# Patient Record
Sex: FEMALE | Race: WHITE | NOT HISPANIC OR LATINO | URBAN - METROPOLITAN AREA
[De-identification: names, ages, dates, MRNs, and addresses within clinical notes are randomized per-mention and may not be internally consistent; named-entity substitution may affect disease eponyms.]

---

## 2022-12-01 ENCOUNTER — INPATIENT (INPATIENT)
Facility: HOSPITAL | Age: 77
LOS: 0 days | Discharge: HOME | DRG: 303 | End: 2022-12-02
Attending: INTERNAL MEDICINE | Admitting: INTERNAL MEDICINE
Payer: MEDICAID

## 2022-12-01 VITALS
HEIGHT: 56 IN | SYSTOLIC BLOOD PRESSURE: 125 MMHG | WEIGHT: 229.94 LBS | TEMPERATURE: 98 F | RESPIRATION RATE: 22 BRPM | HEART RATE: 80 BPM | OXYGEN SATURATION: 96 % | DIASTOLIC BLOOD PRESSURE: 68 MMHG

## 2022-12-01 DIAGNOSIS — R60.0 LOCALIZED EDEMA: ICD-10-CM

## 2022-12-01 DIAGNOSIS — I20.0 UNSTABLE ANGINA: ICD-10-CM

## 2022-12-01 DIAGNOSIS — I50.9 HEART FAILURE, UNSPECIFIED: ICD-10-CM

## 2022-12-01 LAB
ALBUMIN SERPL ELPH-MCNC: 3.9 G/DL — SIGNIFICANT CHANGE UP (ref 3.3–5)
ALP SERPL-CCNC: 80 U/L — SIGNIFICANT CHANGE UP (ref 40–120)
ALT FLD-CCNC: 16 U/L — SIGNIFICANT CHANGE UP (ref 10–45)
ANION GAP SERPL CALC-SCNC: 9 MMOL/L — SIGNIFICANT CHANGE UP (ref 5–17)
APTT BLD: 36 SEC — HIGH (ref 27.5–35.5)
AST SERPL-CCNC: 16 U/L — SIGNIFICANT CHANGE UP (ref 10–40)
BASOPHILS # BLD AUTO: 0.02 K/UL — SIGNIFICANT CHANGE UP (ref 0–0.2)
BASOPHILS NFR BLD AUTO: 0.5 % — SIGNIFICANT CHANGE UP (ref 0–2)
BILIRUB SERPL-MCNC: 0.3 MG/DL — SIGNIFICANT CHANGE UP (ref 0.2–1.2)
BUN SERPL-MCNC: 10 MG/DL — SIGNIFICANT CHANGE UP (ref 7–23)
CALCIUM SERPL-MCNC: 9.4 MG/DL — SIGNIFICANT CHANGE UP (ref 8.4–10.5)
CHLORIDE SERPL-SCNC: 102 MMOL/L — SIGNIFICANT CHANGE UP (ref 96–108)
CK MB CFR SERPL CALC: 2 NG/ML — SIGNIFICANT CHANGE UP (ref 0–6.7)
CK SERPL-CCNC: 44 U/L — SIGNIFICANT CHANGE UP (ref 25–170)
CO2 SERPL-SCNC: 29 MMOL/L — SIGNIFICANT CHANGE UP (ref 22–31)
CREAT SERPL-MCNC: 0.64 MG/DL — SIGNIFICANT CHANGE UP (ref 0.5–1.3)
EGFR: 91 ML/MIN/1.73M2 — SIGNIFICANT CHANGE UP
EOSINOPHIL # BLD AUTO: 0.04 K/UL — SIGNIFICANT CHANGE UP (ref 0–0.5)
EOSINOPHIL NFR BLD AUTO: 1 % — SIGNIFICANT CHANGE UP (ref 0–6)
GLUCOSE SERPL-MCNC: 91 MG/DL — SIGNIFICANT CHANGE UP (ref 70–99)
HCT VFR BLD CALC: 39.5 % — SIGNIFICANT CHANGE UP (ref 34.5–45)
HGB BLD-MCNC: 12.7 G/DL — SIGNIFICANT CHANGE UP (ref 11.5–15.5)
IMM GRANULOCYTES NFR BLD AUTO: 0.2 % — SIGNIFICANT CHANGE UP (ref 0–0.9)
INR BLD: 1.04 — SIGNIFICANT CHANGE UP (ref 0.88–1.16)
LYMPHOCYTES # BLD AUTO: 1.71 K/UL — SIGNIFICANT CHANGE UP (ref 1–3.3)
LYMPHOCYTES # BLD AUTO: 41.9 % — SIGNIFICANT CHANGE UP (ref 13–44)
MAGNESIUM SERPL-MCNC: 2 MG/DL — SIGNIFICANT CHANGE UP (ref 1.6–2.6)
MCHC RBC-ENTMCNC: 30 PG — SIGNIFICANT CHANGE UP (ref 27–34)
MCHC RBC-ENTMCNC: 32.2 GM/DL — SIGNIFICANT CHANGE UP (ref 32–36)
MCV RBC AUTO: 93.4 FL — SIGNIFICANT CHANGE UP (ref 80–100)
MONOCYTES # BLD AUTO: 0.41 K/UL — SIGNIFICANT CHANGE UP (ref 0–0.9)
MONOCYTES NFR BLD AUTO: 10 % — SIGNIFICANT CHANGE UP (ref 2–14)
NEUTROPHILS # BLD AUTO: 1.89 K/UL — SIGNIFICANT CHANGE UP (ref 1.8–7.4)
NEUTROPHILS NFR BLD AUTO: 46.4 % — SIGNIFICANT CHANGE UP (ref 43–77)
NRBC # BLD: 0 /100 WBCS — SIGNIFICANT CHANGE UP (ref 0–0)
NT-PROBNP SERPL-SCNC: 133 PG/ML — SIGNIFICANT CHANGE UP (ref 0–300)
PHOSPHATE SERPL-MCNC: 2.4 MG/DL — LOW (ref 2.5–4.5)
PLATELET # BLD AUTO: 164 K/UL — SIGNIFICANT CHANGE UP (ref 150–400)
POTASSIUM SERPL-MCNC: 4.4 MMOL/L — SIGNIFICANT CHANGE UP (ref 3.5–5.3)
POTASSIUM SERPL-SCNC: 4.4 MMOL/L — SIGNIFICANT CHANGE UP (ref 3.5–5.3)
PROT SERPL-MCNC: 7.3 G/DL — SIGNIFICANT CHANGE UP (ref 6–8.3)
PROTHROM AB SERPL-ACNC: 12.4 SEC — SIGNIFICANT CHANGE UP (ref 10.5–13.4)
RAPID RVP RESULT: SIGNIFICANT CHANGE UP
RBC # BLD: 4.23 M/UL — SIGNIFICANT CHANGE UP (ref 3.8–5.2)
RBC # FLD: 13.7 % — SIGNIFICANT CHANGE UP (ref 10.3–14.5)
SARS-COV-2 RNA SPEC QL NAA+PROBE: SIGNIFICANT CHANGE UP
SODIUM SERPL-SCNC: 140 MMOL/L — SIGNIFICANT CHANGE UP (ref 135–145)
TROPONIN T SERPL-MCNC: 0.01 NG/ML — SIGNIFICANT CHANGE UP (ref 0–0.01)
TROPONIN T SERPL-MCNC: <0.01 NG/ML — SIGNIFICANT CHANGE UP (ref 0–0.01)
WBC # BLD: 4.08 K/UL — SIGNIFICANT CHANGE UP (ref 3.8–10.5)
WBC # FLD AUTO: 4.08 K/UL — SIGNIFICANT CHANGE UP (ref 3.8–10.5)

## 2022-12-01 PROCEDURE — 71045 X-RAY EXAM CHEST 1 VIEW: CPT | Mod: 26

## 2022-12-01 PROCEDURE — 99284 EMERGENCY DEPT VISIT MOD MDM: CPT

## 2022-12-01 PROCEDURE — 99222 1ST HOSP IP/OBS MODERATE 55: CPT

## 2022-12-01 PROCEDURE — 93306 TTE W/DOPPLER COMPLETE: CPT | Mod: 26

## 2022-12-01 PROCEDURE — 71045 X-RAY EXAM CHEST 1 VIEW: CPT | Mod: 26,77

## 2022-12-01 RX ORDER — FUROSEMIDE 40 MG
20 TABLET ORAL DAILY
Refills: 0 | Status: DISCONTINUED | OUTPATIENT
Start: 2022-12-02 | End: 2022-12-02

## 2022-12-01 RX ORDER — PANTOPRAZOLE SODIUM 20 MG/1
40 TABLET, DELAYED RELEASE ORAL
Refills: 0 | Status: DISCONTINUED | OUTPATIENT
Start: 2022-12-01 | End: 2022-12-02

## 2022-12-01 RX ORDER — ASPIRIN/CALCIUM CARB/MAGNESIUM 324 MG
1 TABLET ORAL
Qty: 0 | Refills: 0 | DISCHARGE

## 2022-12-01 RX ORDER — ASPIRIN/CALCIUM CARB/MAGNESIUM 324 MG
324 TABLET ORAL ONCE
Refills: 0 | Status: COMPLETED | OUTPATIENT
Start: 2022-12-01 | End: 2022-12-01

## 2022-12-01 RX ORDER — METOPROLOL TARTRATE 50 MG
25 TABLET ORAL ONCE
Refills: 0 | Status: DISCONTINUED | OUTPATIENT
Start: 2022-12-01 | End: 2022-12-01

## 2022-12-01 RX ORDER — METOPROLOL TARTRATE 50 MG
25 TABLET ORAL
Refills: 0 | Status: DISCONTINUED | OUTPATIENT
Start: 2022-12-01 | End: 2022-12-02

## 2022-12-01 RX ORDER — FUROSEMIDE 40 MG
1 TABLET ORAL
Qty: 0 | Refills: 0 | DISCHARGE

## 2022-12-01 RX ORDER — ASPIRIN/CALCIUM CARB/MAGNESIUM 324 MG
81 TABLET ORAL DAILY
Refills: 0 | Status: DISCONTINUED | OUTPATIENT
Start: 2022-12-02 | End: 2022-12-02

## 2022-12-01 RX ORDER — NITROGLYCERIN 6.5 MG
0.4 CAPSULE, EXTENDED RELEASE ORAL ONCE
Refills: 0 | Status: COMPLETED | OUTPATIENT
Start: 2022-12-01 | End: 2022-12-01

## 2022-12-01 RX ORDER — HEPARIN SODIUM 5000 [USP'U]/ML
5000 INJECTION INTRAVENOUS; SUBCUTANEOUS EVERY 8 HOURS
Refills: 0 | Status: DISCONTINUED | OUTPATIENT
Start: 2022-12-01 | End: 2022-12-02

## 2022-12-01 RX ADMIN — HEPARIN SODIUM 5000 UNIT(S): 5000 INJECTION INTRAVENOUS; SUBCUTANEOUS at 22:32

## 2022-12-01 RX ADMIN — Medication 324 MILLIGRAM(S): at 13:50

## 2022-12-01 RX ADMIN — Medication 25 MILLIGRAM(S): at 17:52

## 2022-12-01 RX ADMIN — Medication 0.4 MILLIGRAM(S): at 17:30

## 2022-12-01 NOTE — H&P ADULT - NSHPROSALLOTHERNEGRD_GEN_ALL_CORE
Emergency Room Nursing Note        Patient Name: Gabriel Weber      : 1978             MRN: [id number]      Chief Complaint:  Abnormal Lab Results      Admit Diagnosis: No admission diagnoses are documented for this encounter. Admitting Provider: No admitting provider for patient encounter. Surgery: * No surgery found *           Obtained verbal consent from patient consenting to a Legal Blood Draw witness by Lynette Cruz RN, Officer Cortney Ramirez also present. Patient alert & oriented at time of consent. Patient verbalizes understanding of the purpose of the Legal Blood Draw.          Lines:        Signed by: Jaylan Lujan RN, ALBER, BSN, [facility]IA [facility]                                              2022 at 10:13 PM All other review of systems negative, except as noted in HPI

## 2022-12-01 NOTE — ED ADULT NURSE REASSESSMENT NOTE - NS ED NURSE REASSESS COMMENT FT1
ECHO being performed @ bedside to keep pt. in ED on monitor. Cards PA Brandon called for update on plan for admission. Pt. must remain on monitor to all off unit tests. all aware of plan.

## 2022-12-01 NOTE — H&P ADULT - PROBLEM SELECTOR PLAN 1
P/w CP "burning" w/ radiation to L arm and associated SOB/BECK/nausea. No established Cardiologist or prior w/u.  -Currently w/ 8/10 CP, VSS WNL, s/p NTG 0.4mg SL x1 and Maalox  -EKG: NSR @78bpm w/o ischemic changes. CE's neg x1, f/u serial CE's at 6pm and 10pm  -Echo ordered, f/u  -s/p ASA 324mg x1 in the ED, c/w ASA 81mg QD. Started Lopressor 25mg PO BID and Pantoprazole PO 40mg QD  -Pt initially recommended for cath but refused  -NPO after MN for CCTA 12/2AM, f/u  -Lipid panel, A1c, TSH ordered for AM, f/u  -VS per routine, cont tele/pulse ox P/w CP "burning" w/ radiation to L arm and associated SOB/BECK/nausea. No established Cardiologist or prior w/u.  -Currently w/ 8/10 CP, VSS WNL, s/p NTG 0.4mg SL x1 and Maalox  -EKG: NSR @78bpm w/o ischemic changes. CE's neg x1, f/u serial CE's at 6pm and 10pm  -Echo ordered, f/u  -Initial CXR poor quality, repeat ordered, f/u  -s/p ASA 324mg x1 in the ED, c/w ASA 81mg QD. Started Lopressor 25mg PO BID and Pantoprazole PO 40mg QD  -Pt initially recommended for cath but refused  -NPO after MN for CCTA 12/2AM, f/u  -Lipid panel, A1c, TSH ordered for AM, f/u  -VS per routine, cont tele/pulse ox

## 2022-12-01 NOTE — ED ADULT NURSE NOTE - BEFORE THE ILLNESS OR INJURY
Negative for shortness of breath and wheezing. Recent hospitalization for exacerbation of COPD. Currently feels better in terms of her breathing. Cardiovascular:        Recent myocardial infarction with recent heart catheterization   Gastrointestinal: Negative. Endocrine: Positive for polydipsia, polyphagia and polyuria. Morbid obesity. Significantly elevated hemoglobin A1c at 9.5. Genitourinary: Positive for enuresis. Musculoskeletal:        Improved greater trochanteric pain. Occasional lumbar discomfort. Skin:        Venous stasis changes of the lower extremity. Allergic/Immunologic: Negative. Neurological: Negative. Hematological: Negative. Psychiatric/Behavioral:        Increased anxiety with breathing issues. Prior to Visit Medications    Medication Sig Taking? Authorizing Provider   COMBIVENT RESPIMAT  MCG/ACT AERS inhaler 1 PUFF(S) INHALED 4 TIMES A DAY FOR 5 DAYS THEN USE AS NEEDED FOR SHORTNESS OF BREATH OR WHEEZING Yes Historical Provider, MD   predniSONE (DELTASONE) 20 MG tablet TAKE TWO TABLETS TWO TIMES A DAY X3 DAYS THEN 1 TAB TWO TIMES A DAY X3 DAYS THEN 1 TABLET DAILY X3 DAYS 1/2 TAB DAILY X2 DAYS Yes Historical Provider, MD   sertraline (ZOLOFT) 100 MG tablet !/2 for one week then one po daily Yes Portillo Decker MD   ALPRAZolam (XANAX) 0.25 MG tablet Take 1 tablet by mouth 3 times daily as needed for Anxiety for up to 30 days.  Yes Portillo Decker MD   vitamin D (ERGOCALCIFEROL) 1.25 MG (07986 UT) CAPS capsule Take 1 capsule by mouth Twice a Week Yes Portillo Decker MD   albuterol sulfate  (90 Base) MCG/ACT inhaler Inhale 2 puffs into the lungs every 6 hours as needed for Wheezing Yes Portillo Decker MD   furosemide (LASIX) 80 MG tablet Take 1 tablet by mouth daily Yes Portillo Decker MD   clopidogrel (PLAVIX) 75 MG tablet Take 1 tablet by mouth daily Yes Portillo Decker MD   Fluticasone furoate-vilanterol (BREO ELLIPTA) 200-25 MCG/INH AEPB inhaler Inhale 1 puff into the lungs daily Yes Laney Escobar MD   glimepiride (AMARYL) 4 MG tablet Take 1 tablet by mouth 2 times daily (before meals) Yes Laney Escobar MD   lisinopril (PRINIVIL;ZESTRIL) 20 MG tablet Take 1 tablet by mouth daily Yes Laney Escobar MD   metFORMIN (GLUCOPHAGE) 1000 MG tablet Take 1 tablet by mouth 2 times daily (with meals) Yes Laney Escobar MD   rosuvastatin (CRESTOR) 40 MG tablet Take 1 tablet by mouth daily Yes Laney Escobar MD   ranolazine (RANEXA) 500 MG extended release tablet TAKE 1 TABLET BY MOUTH TWICE DAILY Yes Historical Provider, MD   montelukast (SINGULAIR) 10 MG tablet  Yes Historical Provider, MD   carvedilol (COREG) 6.25 MG tablet TAKE 1 TABLET BY MOUTH TWICE DAILY Yes Historical Provider, MD   insulin glargine (LANTUS SOLOSTAR) 100 UNIT/ML injection pen !0 units nightly and increase by 5 units every 3 days until -125  Patient taking differently: Taking 45 units daily Yes Laney Escobar MD   aspirin 81 MG EC tablet Take 81 mg by mouth daily Yes Historical Provider, MD   ezetimibe (ZETIA) 10 MG tablet Take 1 tablet by mouth daily Yes Laney Escobar MD   ipratropium-albuterol (DUONEB) 0.5-2.5 (3) MG/3ML SOLN nebulizer solution Inhale 3 mLs into the lungs every 4 hours Yes Laney Escobar MD   zinc gluconate 50 MG tablet Take 50 mg by mouth daily  Historical Provider, MD   isosorbide dinitrate (ISORDIL) 30 MG tablet Take 1 tablet by mouth daily  Laney Escobar MD   potassium chloride (KLOR-CON) 10 MEQ extended release tablet Take 1 tablet by mouth daily  Laney Escobar MD   Insulin Pen Needle 32G X 5 MM MISC 1 each by Does not apply route daily  Laney Escobar MD        Allergies   Allergen Reactions    Iodides      Could not breathe, gasping, coughing       Past Medical History:   Diagnosis Date    CAD (coronary artery disease) 2005    First MI at age 36.,  CABG in 2005 triple vessel, total of 4 stents placed-last in October 2018.     COPD (chronic obstructive pulmonary disease) ([facility] 1    Enuresis age 13    HTN (hypertension) 2005    Hyperlipidemia 2005    Lumbar disc disease     Lymphedema 10/2018    Tobacco abuse 1985    Tobacco abuse counseling July second 2019    Type 2 diabetes mellitus ([facility]     Warthin's tumor 10/2018       Past Surgical History:   Procedure Laterality Date    CARDIAC CATHETERIZATION  12/2020    CORONARY ARTERY BYPASS GRAFT  2005    three vessel    CYSTOSCOPY      For enuresis    HYSTERECTOMY      Menorrhagia    TONSILLECTOMY AND ADENOIDECTOMY      as a child    TUBAL LIGATION         Social History     Socioeconomic History    Marital status:      Spouse name: Not on file    Number of children: Not on file    Years of education: Not on file    Highest education level: Not on file   Occupational History    Not on file   Social Needs    Financial resource strain: Not on file    Food insecurity     Worry: Not on file     Inability: Not on file    Transportation needs     Medical: Not on file     Non-medical: Not on file   Tobacco Use    Smoking status: Former Smoker     Packs/day: 0.50     Years: 40.00     Pack years: 20.00     Types: Cigarettes    Smokeless tobacco: Never Used   Substance and Sexual Activity    Alcohol use: Not Currently    Drug use: Never    Sexual activity: Not on file   Lifestyle    Physical activity     Days per week: 0 days     Minutes per session: 0 min    Stress: Very much   Relationships    Social connections     Talks on phone: Not on file     Gets together: Not on file     Attends Sikhism service: Not on file     Active member of club or organization: Not on file     Attends meetings of clubs or organizations: Not on file     Relationship status: Not on file    Intimate partner violence     Fear of current or ex partner: Not on file     Emotionally abused: Not on file     Physically abused: Not on file     Forced sexual activity: Not on file   Other Topics Concern  Not on file   Social History Narrative    Not on file        Family History   Problem Relation Age of Onset    Coronary Art Dis Mother     Diabetes Mother     Cancer Mother     COPD Father        Vitals:    05/12/21 0720   BP: 120/80   Pulse: 87   Resp: 20   Temp: 97.6 °F (36.4 °C)   SpO2: 96%   Weight: 278 lb (126.1 kg)     Estimated body mass index is 42.27 kg/m² as calculated from the following:    Height as of 12/10/20: 5' 8\" (1.727 m). Weight as of this encounter: 278 lb (126.1 kg). Physical Exam  Constitutional:       Comments: Very obese individual in no apparent distress   HENT:      Head: Normocephalic and atraumatic. Right Ear: External ear normal.      Left Ear: External ear normal.      Nose: Nose normal.   Eyes:      Conjunctiva/sclera: Conjunctivae normal.      Pupils: Pupils are equal, round, and reactive to light. Neck:      Musculoskeletal: Normal range of motion and neck supple. Cardiovascular:      Rate and Rhythm: Normal rate and regular rhythm. Heart sounds: Normal heart sounds. Comments: 2-3+ pitting edema to the knee bilaterally  Pulmonary:      Comments: Decreased breath sounds throughout all lung fields but no evidence of wheeze, rhonchi or rales. Musculoskeletal: Normal range of motion. Comments: Imitation of range of motion to both inversion and eversion of the hips   Skin:     Comments: Venous stasis changes below the mid tibia bilaterally. No evidence of cellulitis today. Neurological:      Mental Status: She is alert and oriented to person, place, and time. Psychiatric:         Behavior: Behavior normal.         Thought Content: Thought content normal.         Judgment: Judgment normal.       Deborah Richards was seen today for follow-up from hospital.    Diagnoses and all orders for this visit:    Centrilobular emphysema (HCC)  -     ALPRAZolam (XANAX) 0.25 MG tablet; Take 1 tablet by mouth 3 times daily as needed for Anxiety for up to 30 days.   - Jess Reilly DO, Cardiology, [facility]    Coronary artery disease involving native coronary artery of native heart without angina pectoris  -     [facility], Priscila DO Luis A, Cardiology, [facility]    Type 2 diabetes mellitus with other circulatory complication, without long-term current use of insulin ([facility]    Acute exacerbation of chronic obstructive pulmonary disease (COPD) ([facility]    Pain of both hip joints    Mixed hyperlipidemia    Vitamin D deficiency    Tobacco abuse    Other orders  -     sertraline (ZOLOFT) 100 MG tablet; !/2 for one week then one po daily    6-minute walk will be performed. Smoking cessation is urged. Referral to local pulmonary and local cardiology. Hopefully trying to tie up all of her care and easily accessed location. Patient will be given sertraline 100 mg initially starting at 50 for a week and then subsequently 100. I have warned her of potential side effects of this medication. Patient is to be seen back in 6 weeks to assess response. In the meantime pulmonary and cardiology evaluations. No

## 2022-12-01 NOTE — H&P ADULT - NSHPSOURCEINFORD_GEN_ALL_CORE
Chart(s)/Patient Medical Necessity Information: It is in your best interest to select a reason for this procedure from the list below. All of these items fulfill various CMS LCD requirements except the new and changing color options. Consent: The patient's consent was obtained including but not limited to risks of crusting, scabbing, blistering, scarring, darker or lighter pigmentary change, recurrence, incomplete removal and infection. Render Note In Bullet Format When Appropriate: No Anesthesia Volume In Cc: 4 Treatment Number (Will Not Render If 0): 0 Post-Care Instructions: I reviewed with the patient in detail post-care instructions. Patient is to wear sunprotection, and avoid picking at any of the treated lesions. Pt may apply Vaseline to crusted or scabbing areas. Detail Level: Simple Medical Necessity Clause: This procedure was medically necessary because the lesions that were treated were: Chart(s)/Patient/Child

## 2022-12-01 NOTE — ED ADULT TRIAGE NOTE - CHIEF COMPLAINT QUOTE
Patient presents to ED accompanied by daughter for eval of shortness of breath and "heart burning" since waking up this morning.  Takes Furosemide daily.  STAT EKG in progress.

## 2022-12-01 NOTE — H&P ADULT - NSHPLABSRESULTS_GEN_ALL_CORE
CARDIAC MARKERS ( 01 Dec 2022 12:26 )  x     / <0.01 ng/mL / 44 U/L / x     / 2.0 ng/mL                          12.7   4.08  )-----------( 164      ( 01 Dec 2022 12:26 )             39.5   12-01    140  |  102  |  10  ----------------------------<  91  4.4   |  29  |  0.64    Ca    9.4      01 Dec 2022 12:26  Phos  2.4     12-01  Mg     2.0     12-01    TPro  7.3  /  Alb  3.9  /  TBili  0.3  /  DBili  x   /  AST  16  /  ALT  16  /  AlkPhos  80  12-01    PT/INR - ( 01 Dec 2022 12:26 )   PT: 12.4 sec;   INR: 1.04          PTT - ( 01 Dec 2022 12:26 )  PTT:36.0 sec

## 2022-12-01 NOTE — H&P ADULT - HISTORY OF PRESENT ILLNESS
*Meds confirmed w/ pt and daughter  76 y/o Occitan speaking Female w/ PMHx of chronic LE edema on lasix and gallstones, no other reported PMHx/PSHx, who now presents to [facility] ED c/o 8/10 L-sided CP described as, "burning" w/ radiation to the L shoulder, present at rest and independent of exertion, and w/ associated SOB, BECK after minimal exertion (walking around her apartment), and non-productive cough x3 days. Pt denies palpitations, dizziness, LOC, V/D, fever/chills/sick contact, diaphoresis, and orthopnea/PND. Pt denies ever having undergone a cardiac w/u and does not have an established Cardiologist.  In the ED, VS: T 97.9F, HR 80, /68, RR 18 non-labored, SpO2 96% on RA. EKG: NSR @78bpm w/o ischemic changes. Labs significant for COVID PCR neg, RVP neg, CE's neg x1, BNP WNL. S/p ASA 324mg x1 in the ED, pt is now admitted to cardiac tele for further management of unstable angina.

## 2022-12-01 NOTE — H&P ADULT - NSHPOUTPATIENTPROVIDERS_GEN_ALL_CORE
Dr. Jose Cheney (PCP) [phone]  Pamela (Daughter/HHA)[phone]           Gala (granddaugher/HCP) [phone]

## 2022-12-01 NOTE — ED PROVIDER NOTE - CLINICAL SUMMARY MEDICAL DECISION MAKING FREE TEXT BOX
77F denies PMH/PSH, takes lasix 20 mg q day (compliant) for "water in legs" p/w 3 day hx of L sided chest "burning" radiating to L shoulder 8/10  in severity worse on exertion with associated SOB, BECK, and cough. On exam, VS wnl, +RLL crackles and 2+ pitting edema.    ddx: CHF vs ACS vs pna vs viral illness vs bronchitis    Plan:  - ekg  - labs  - cxr  - rvp  - reassess

## 2022-12-01 NOTE — H&P ADULT - ASSESSMENT
76 y/o Ukrainian speaking Female w/ PMHx of chronic LE edema on lasix and gallstones, no other reported PMHx/PSHx, who now presents to [facility] ED c/o 8/10 L-sided CP described as, "burning" w/ radiation to the L shoulder, present at rest and independent of exertion, and w/ associated SOB, BECK after mild exertion, and non-productive cough x3 days. Pt is now admitted to cardiac tele for further management of unstable angina.

## 2022-12-01 NOTE — ED ADULT NURSE NOTE - OBJECTIVE STATEMENT
76 y/o female w/ hx of lower extremity swelling, on Lasix 20 mg QD, presents to the ED c/o left sided chest burning radiating to left shoulder associated w/ SOB,  and dry cough. Denies fever, chills, NVD, abdominal pain, back pain.

## 2022-12-01 NOTE — ED PROVIDER NOTE - OBJECTIVE STATEMENT
pt declines  use requests daughter at bedside to interpret    77F denies PMH/PSH, takes lasix 20 mg q day (compliant) for "water in legs" p/w 3 day hx of L sided chest "burning" radiating to L shoulder 8/10  in severity worse on exertion with associated SOB, BECK, and cough. legs are not more swollen than normal today. +hx of similar sx, does not have cardiologist. denies f/c/congestion/n/v/d/rash.

## 2022-12-01 NOTE — PATIENT PROFILE ADULT - FALL HARM RISK - HARM RISK INTERVENTIONS

## 2022-12-01 NOTE — ED PROVIDER NOTE - PROGRESS NOTE DETAILS
labs trop ekg wnl however discussed with cardiology attending and PA agree to admit pt due to concerning cp will admit

## 2022-12-01 NOTE — H&P ADULT - PROBLEM SELECTOR PLAN 2
Hx of chronic LE edema, currently at baseline 1+ non-pitting  -Recently started on Lasix 20mg PO QD by PCP 1 month ago which improved  -Echo as above  -LE duplex ordered, f/u      VTE ppx: Heparin 5000u q8hrs  GI ppx: Pantoprazole 40mg QD  Diet: DASH  PT consulted, f/u recs  Dispo: Pending clinical progression    Case d/w Dr. Jean-Baptiste    Case d/w _______ Hx of chronic LE edema, currently at baseline 1+ non-pitting  -Recently started on Lasix 20mg PO QD by PCP 1 month ago which improved, c/w Lasix 20mg PO QD  -Echo as above  -LE duplex ordered, f/u      VTE ppx: Heparin 5000u q8hrs  GI ppx: Pantoprazole 40mg QD  Diet: DASH  PT consulted, f/u recs  Dispo: Pending clinical progression    Case d/w Dr. Jean-Baptiste    Case d/w _______ Hx of chronic LE edema, currently at baseline 1+ non-pitting  -Recently started on Lasix 20mg PO QD by PCP 1 month ago which improved, c/w Lasix 20mg PO QD  -Echo as above  -LE duplex ordered, f/u      VTE ppx: Heparin 5000u q8hrs  GI ppx: Pantoprazole 40mg QD  Diet: DASH  PT consulted, f/u recs  Dispo: Pending clinical progression    Case d/w Dr. Jean-Baptiste Hx of chronic LE edema, currently at baseline 1+ non-pitting  -BNP WNL. CXR as above  -Recently started on Lasix 20mg PO QD by PCP 1 month ago which improved, c/w Lasix 20mg PO QD  -Echo as above  -LE duplex ordered, f/u      VTE ppx: Heparin 5000u q8hrs  GI ppx: Pantoprazole 40mg QD  Diet: DASH  PT consulted, f/u recs  Dispo: Pending clinical progression    Case d/w Dr. Jean-Baptiste

## 2022-12-02 VITALS — SYSTOLIC BLOOD PRESSURE: 120 MMHG | HEART RATE: 70 BPM | DIASTOLIC BLOOD PRESSURE: 60 MMHG

## 2022-12-02 LAB
A1C WITH ESTIMATED AVERAGE GLUCOSE RESULT: 5.8 % — HIGH (ref 4–5.6)
ANION GAP SERPL CALC-SCNC: 10 MMOL/L — SIGNIFICANT CHANGE UP (ref 5–17)
BASOPHILS # BLD AUTO: 0.01 K/UL — SIGNIFICANT CHANGE UP (ref 0–0.2)
BASOPHILS NFR BLD AUTO: 0.3 % — SIGNIFICANT CHANGE UP (ref 0–2)
BUN SERPL-MCNC: 13 MG/DL — SIGNIFICANT CHANGE UP (ref 7–23)
CALCIUM SERPL-MCNC: 9.3 MG/DL — SIGNIFICANT CHANGE UP (ref 8.4–10.5)
CHLORIDE SERPL-SCNC: 102 MMOL/L — SIGNIFICANT CHANGE UP (ref 96–108)
CHOLEST SERPL-MCNC: 175 MG/DL — SIGNIFICANT CHANGE UP
CK MB CFR SERPL CALC: 1.7 NG/ML — SIGNIFICANT CHANGE UP (ref 0–6.7)
CK SERPL-CCNC: 46 U/L — SIGNIFICANT CHANGE UP (ref 25–170)
CO2 SERPL-SCNC: 25 MMOL/L — SIGNIFICANT CHANGE UP (ref 22–31)
CREAT SERPL-MCNC: 0.63 MG/DL — SIGNIFICANT CHANGE UP (ref 0.5–1.3)
EGFR: 91 ML/MIN/1.73M2 — SIGNIFICANT CHANGE UP
EOSINOPHIL # BLD AUTO: 0.07 K/UL — SIGNIFICANT CHANGE UP (ref 0–0.5)
EOSINOPHIL NFR BLD AUTO: 1.8 % — SIGNIFICANT CHANGE UP (ref 0–6)
ESTIMATED AVERAGE GLUCOSE: 120 MG/DL — HIGH (ref 68–114)
GLUCOSE SERPL-MCNC: 115 MG/DL — HIGH (ref 70–99)
HCT VFR BLD CALC: 38 % — SIGNIFICANT CHANGE UP (ref 34.5–45)
HCV AB S/CO SERPL IA: 0.04 S/CO — SIGNIFICANT CHANGE UP
HCV AB SERPL-IMP: SIGNIFICANT CHANGE UP
HDLC SERPL-MCNC: 59 MG/DL — SIGNIFICANT CHANGE UP
HGB BLD-MCNC: 12.5 G/DL — SIGNIFICANT CHANGE UP (ref 11.5–15.5)
IMM GRANULOCYTES NFR BLD AUTO: 0.3 % — SIGNIFICANT CHANGE UP (ref 0–0.9)
LIPID PNL WITH DIRECT LDL SERPL: 98 MG/DL — SIGNIFICANT CHANGE UP
LYMPHOCYTES # BLD AUTO: 1.41 K/UL — SIGNIFICANT CHANGE UP (ref 1–3.3)
LYMPHOCYTES # BLD AUTO: 36.3 % — SIGNIFICANT CHANGE UP (ref 13–44)
MAGNESIUM SERPL-MCNC: 2 MG/DL — SIGNIFICANT CHANGE UP (ref 1.6–2.6)
MCHC RBC-ENTMCNC: 30 PG — SIGNIFICANT CHANGE UP (ref 27–34)
MCHC RBC-ENTMCNC: 32.9 GM/DL — SIGNIFICANT CHANGE UP (ref 32–36)
MCV RBC AUTO: 91.1 FL — SIGNIFICANT CHANGE UP (ref 80–100)
MONOCYTES # BLD AUTO: 0.4 K/UL — SIGNIFICANT CHANGE UP (ref 0–0.9)
MONOCYTES NFR BLD AUTO: 10.3 % — SIGNIFICANT CHANGE UP (ref 2–14)
NEUTROPHILS # BLD AUTO: 1.98 K/UL — SIGNIFICANT CHANGE UP (ref 1.8–7.4)
NEUTROPHILS NFR BLD AUTO: 51 % — SIGNIFICANT CHANGE UP (ref 43–77)
NON HDL CHOLESTEROL: 116 MG/DL — SIGNIFICANT CHANGE UP
NRBC # BLD: 0 /100 WBCS — SIGNIFICANT CHANGE UP (ref 0–0)
PLATELET # BLD AUTO: 147 K/UL — LOW (ref 150–400)
POTASSIUM SERPL-MCNC: 4.3 MMOL/L — SIGNIFICANT CHANGE UP (ref 3.5–5.3)
POTASSIUM SERPL-SCNC: 4.3 MMOL/L — SIGNIFICANT CHANGE UP (ref 3.5–5.3)
RBC # BLD: 4.17 M/UL — SIGNIFICANT CHANGE UP (ref 3.8–5.2)
RBC # FLD: 13.5 % — SIGNIFICANT CHANGE UP (ref 10.3–14.5)
SODIUM SERPL-SCNC: 137 MMOL/L — SIGNIFICANT CHANGE UP (ref 135–145)
TRIGL SERPL-MCNC: 92 MG/DL — SIGNIFICANT CHANGE UP
TSH SERPL-MCNC: 0.89 UIU/ML — SIGNIFICANT CHANGE UP (ref 0.27–4.2)
WBC # BLD: 3.88 K/UL — SIGNIFICANT CHANGE UP (ref 3.8–10.5)
WBC # FLD AUTO: 3.88 K/UL — SIGNIFICANT CHANGE UP (ref 3.8–10.5)

## 2022-12-02 PROCEDURE — 84443 ASSAY THYROID STIM HORMONE: CPT

## 2022-12-02 PROCEDURE — 86803 HEPATITIS C AB TEST: CPT

## 2022-12-02 PROCEDURE — 80053 COMPREHEN METABOLIC PANEL: CPT

## 2022-12-02 PROCEDURE — 83036 HEMOGLOBIN GLYCOSYLATED A1C: CPT

## 2022-12-02 PROCEDURE — 0225U NFCT DS DNA&RNA 21 SARSCOV2: CPT

## 2022-12-02 PROCEDURE — 82550 ASSAY OF CK (CPK): CPT

## 2022-12-02 PROCEDURE — 75574 CT ANGIO HRT W/3D IMAGE: CPT

## 2022-12-02 PROCEDURE — 75574 CT ANGIO HRT W/3D IMAGE: CPT | Mod: 26

## 2022-12-02 PROCEDURE — 80048 BASIC METABOLIC PNL TOTAL CA: CPT

## 2022-12-02 PROCEDURE — 97162 PT EVAL MOD COMPLEX 30 MIN: CPT

## 2022-12-02 PROCEDURE — 71045 X-RAY EXAM CHEST 1 VIEW: CPT

## 2022-12-02 PROCEDURE — 99239 HOSP IP/OBS DSCHRG MGMT >30: CPT

## 2022-12-02 PROCEDURE — 84100 ASSAY OF PHOSPHORUS: CPT

## 2022-12-02 PROCEDURE — 97116 GAIT TRAINING THERAPY: CPT

## 2022-12-02 PROCEDURE — C8929: CPT

## 2022-12-02 PROCEDURE — 36415 COLL VENOUS BLD VENIPUNCTURE: CPT

## 2022-12-02 PROCEDURE — 82553 CREATINE MB FRACTION: CPT

## 2022-12-02 PROCEDURE — 83735 ASSAY OF MAGNESIUM: CPT

## 2022-12-02 PROCEDURE — 80061 LIPID PANEL: CPT

## 2022-12-02 PROCEDURE — 85025 COMPLETE CBC W/AUTO DIFF WBC: CPT

## 2022-12-02 PROCEDURE — 99285 EMERGENCY DEPT VISIT HI MDM: CPT | Mod: 25

## 2022-12-02 PROCEDURE — 85730 THROMBOPLASTIN TIME PARTIAL: CPT

## 2022-12-02 PROCEDURE — 84484 ASSAY OF TROPONIN QUANT: CPT

## 2022-12-02 PROCEDURE — 83880 ASSAY OF NATRIURETIC PEPTIDE: CPT

## 2022-12-02 PROCEDURE — 85610 PROTHROMBIN TIME: CPT

## 2022-12-02 RX ORDER — ATORVASTATIN CALCIUM 80 MG/1
1 TABLET, FILM COATED ORAL
Qty: 30 | Refills: 0
Start: 2022-12-02 | End: 2022-12-31

## 2022-12-02 RX ORDER — ATORVASTATIN CALCIUM 80 MG/1
20 TABLET, FILM COATED ORAL AT BEDTIME
Refills: 0 | Status: DISCONTINUED | OUTPATIENT
Start: 2022-12-02 | End: 2022-12-02

## 2022-12-02 RX ADMIN — Medication 81 MILLIGRAM(S): at 14:14

## 2022-12-02 RX ADMIN — HEPARIN SODIUM 5000 UNIT(S): 5000 INJECTION INTRAVENOUS; SUBCUTANEOUS at 14:14

## 2022-12-02 RX ADMIN — Medication 20 MILLIGRAM(S): at 05:56

## 2022-12-02 RX ADMIN — HEPARIN SODIUM 5000 UNIT(S): 5000 INJECTION INTRAVENOUS; SUBCUTANEOUS at 05:57

## 2022-12-02 RX ADMIN — Medication 25 MILLIGRAM(S): at 05:57

## 2022-12-02 NOTE — PHYSICAL THERAPY INITIAL EVALUATION ADULT - TRANSFER SKILLS, REHAB EVAL
Called patient and left a voicemail to call us back to tell us what the size is for her needles and brand that she has at home   needs device

## 2022-12-02 NOTE — DISCHARGE NOTE PROVIDER - NSDCMRMEDTOKEN_GEN_ALL_CORE_FT
Aspirin Enteric Coated 81 mg oral delayed release tablet: 1 tab(s) orally once a day  atorvastatin 20 mg oral tablet: 1 tab(s) orally once a day (at bedtime)  furosemide 20 mg oral tablet: 1 tab(s) orally once a day

## 2022-12-02 NOTE — DISCHARGE NOTE PROVIDER - NSDCCPTREATMENT_GEN_ALL_CORE_FT
PRINCIPAL PROCEDURE  Procedure: 2D echocardiography  Findings and Treatment: FINDINGS:  Left Ventricle:  There is mild concentric left ventricular hypertrophy. The left ventricle is normal in size and systolic function with a calculated ejection fraction of 65%. There are no regional wall motion abnormalities seen. There is normal left ventricular diastolic function and filling pressure.  Right Ventricle:  The right ventricle is normal in size. Right ventricular systolic   function is normal. The tricuspid annular plane systolic excursion   (TAPSE) is 21.30 mm (normal >=17 mm). RV tissue Doppler S' is 16.00 cm/s (normal >10 cm/s).  Left Atrium:  The left atrium is normal in size. Left atrial volume index (JOAQUIN) is   34.0 ml/m².  Right Atrium:  The right atrium is normal in size.  Aortic Valve:  Fibrocalcific tricuspid aortic valve without significant stenosis. There   is mild aortic regurgitation.  Mitral Valve:  Structurally normal mitral valve with normal leaflet excursion. There is trace mitral regurgitation.  Tricuspid Valve:  Structurally normal tricuspid valve with normal leaflet excursion. There   is trace tricuspid regurgitation. Pulmonary artery systolic pressure   (estimated using the tricuspid regurgitant gradient and an estimate of right atrial pressure) is 27 mmHg.  Inferior Vena Cava:  The inferior vena cava is normal in size (<2.1cm) with normal inspiratory collapse (>50%) consistent with normal right atrial pressure (3, range 0-5mmHg).  Pulmonic Valve:  Structurally normal pulmonic valve with normal leaflet excursion. There is trace pulmonic regurgitation.  Aorta:  The aortic root is normal in size. The aortic arch is normal without   evidence of aortic coarctation.  Pericardium:  No pericardial effusion is seen.      SECONDARY PROCEDURE  Procedure: CT cardiac with calcium scoring  Findings and Treatment: IMPRESSION:  Cardiac:  1.  The calcium score is 82 Agatston units, which is at the 52 percentile, adjusted for age, gender and race.  2.  OM1 is small and probably normal  3.  Non-obstructive disease in remaining coronary segments  Non-cardiac:  1. Small type 1 hiatal hernia.    Procedure: CXR, single AP view  Findings and Treatment: FINDINGS:  Single frontal view of the chest demonstrates the lungs to be clear. The   cardiomediastinal silhouette is enlarged. No acute osseous abnormalities.  IMPRESSION: No acute cardiopulmonary disease process. Cardiomegaly.

## 2022-12-02 NOTE — DISCHARGE NOTE PROVIDER - NSDCCPCAREPLAN_GEN_ALL_CORE_FT
PRINCIPAL DISCHARGE DIAGNOSIS  Diagnosis: Chest pain  Assessment and Plan of Treatment: You came into the hospital for chest pain and underwent cardiac work up. Your EKG and bloodwork was unremarkable. You had an echocardiogram that showed normal heart muscle strength. You had a CT Scan of the Heart that showed non-obstructive coronary artery disease, which means you have mild plaque buildup in the arteries of the heart. We recommend for you to continue taking Aspiring 81mg daily and START a cholesterol medication Lipitor 20mg once a day to help prevent the plaque from getting worse in the future. You had an incidental finding of a hiatal hernia which can sometimes cause epigastric pain. Follow up with your primary care doctor for further evaluation of the chest pain if it recurs.

## 2022-12-02 NOTE — DISCHARGE NOTE NURSING/CASE MANAGEMENT/SOCIAL WORK - PATIENT PORTAL LINK FT
You can access the FollowMyHealth Patient Portal offered by [facility] by registering at the following website: [email or web address]. By joining [facility]’s FollowMyHealth portal, you will also be able to view your health information using other applications (apps) compatible with our system.

## 2022-12-02 NOTE — PHYSICAL THERAPY INITIAL EVALUATION ADULT - GENERAL OBSERVATIONS, REHAB EVAL
Pt received semi supine in bed +hep  lock +tele. PT received consent to treat from MARCIN Zapata. Pt was left as received with call bell in reach, VSS, and in nAD. Pt is independent and at baseline amb with QC, D/C from PT services.

## 2022-12-02 NOTE — PHYSICAL THERAPY INITIAL EVALUATION ADULT - PERTINENT HX OF CURRENT PROBLEM, REHAB EVAL
76 y/o Greenlandic speaking Female w/ PMHx of chronic LE edema on lasix and gallstones, no other reported PMHx/PSHx, who now presents to [facility] ED c/o 8/10 L-sided CP described as, "burning" w/ radiation to the L shoulder, present at rest and independent of exertion, and w/ associated SOB, BECK after minimal exertion (walking around her apartment), and non-productive cough x3 days. Pt denies palpitations, dizziness, LOC, V/D, fever/chills/sick contact, diaphoresis, and orthopnea/PND. Pt denies ever having undergone a cardiac w/u and does not have an established Cardiologist.

## 2022-12-02 NOTE — DISCHARGE NOTE PROVIDER - CARE PROVIDER_API CALL
LOGAN LAU  Internal Medicine  [address] [address]  Phone: [phone]  Fax: [phone]  Established Patient  Follow Up Time: 2 weeks    Hernan Jean-Baptiste)  Cardiology  [address] [address]  Phone: [phone]  Fax: [phone]  Follow Up Time: Routine

## 2022-12-02 NOTE — DISCHARGE NOTE PROVIDER - PROVIDER TOKENS
PROVIDER:[TOKEN:[[id number]],FOLLOWUP:[2 weeks],ESTABLISHEDPATIENT:[T]],PROVIDER:[TOKEN:[[id number]],FOLLOWUP:[Routine]]

## 2022-12-08 DIAGNOSIS — Z53.20 PROCEDURE AND TREATMENT NOT CARRIED OUT BECAUSE OF PATIENT'S DECISION FOR UNSPECIFIED REASONS: ICD-10-CM

## 2022-12-08 DIAGNOSIS — I20.0 UNSTABLE ANGINA: ICD-10-CM

## 2022-12-08 DIAGNOSIS — I51.7 CARDIOMEGALY: ICD-10-CM

## 2022-12-08 DIAGNOSIS — I25.110 ATHEROSCLEROTIC HEART DISEASE OF NATIVE CORONARY ARTERY WITH UNSTABLE ANGINA PECTORIS: ICD-10-CM

## 2022-12-08 DIAGNOSIS — R60.0 LOCALIZED EDEMA: ICD-10-CM

## 2022-12-08 DIAGNOSIS — K44.9 DIAPHRAGMATIC HERNIA WITHOUT OBSTRUCTION OR GANGRENE: ICD-10-CM

## 2023-08-28 NOTE — PHYSICAL THERAPY INITIAL EVALUATION ADULT - ADDITIONAL COMMENTS
Pt states she lives with her daughter and  in elevator building [facility]. pt uses QC for ambulation and was independent with ADLs and amb PTA.
resilient/elastic

## 2023-10-25 NOTE — PATIENT PROFILE ADULT - NSTRANSFERBELONGINGSRESP_GEN_A_NUR
CVC triple lumen removed per order. Pt tolerated well, new occlusive dressing applied.   yes Sinus rhythm with PACs, no ischemic changes

## 2024-01-16 NOTE — H&P ADULT - NSCORESITESY/N_GEN_A_CORE_RD
" Emergency Department TeleTriage Encounter Note      CHIEF COMPLAINT    Chief Complaint   Patient presents with    Foot Pain     R foot surg yr ago has hardware and now it's infected, had bone bx       VITAL SIGNS   Initial Vitals [01/16/24 1430]   BP Pulse Resp Temp SpO2   123/61 73 18 98 °F (36.7 °C) 97 %      MAP       --            ALLERGIES    Review of patient's allergies indicates:  No Known Allergies    PROVIDER TRIAGE NOTE  Patient presents with pain, drainage, and "grey" color to right foot. Remote history of ORIF and now thinks there is infection.       ORDERS  Labs Reviewed - No data to display    ED Orders (720h ago, onward)      None              Virtual Visit Note: The provider triage portion of this emergency department evaluation and documentation was performed via [employer], a HIPAA-compliant telemedicine application, in concert with a tele-presenter in the room. A face to face patient evaluation with one of my colleagues will occur once the patient is placed in an emergency department room.      DISCLAIMER: This note was prepared with [employer] voice recognition transcription software. Garbled syntax, mangled pronouns, and other bizarre constructions may be attributed to that software system.    "
No

## 2025-04-17 NOTE — ED ADULT NURSE NOTE - NEURO GAIT
Results called to patients wife. Verbalized understanding.            ----- Message from CHRISTINE Mota sent at 4/15/2025  3:44 PM CDT -----  Minimal anemia.  Liver enzymes are normal.  ----- Message -----  From: Lab, Background User  Sent: 4/15/2025   1:19 PM CDT  To: CHRISTINE Delarosa    
steady